# Patient Record
Sex: FEMALE | HISPANIC OR LATINO | URBAN - METROPOLITAN AREA
[De-identification: names, ages, dates, MRNs, and addresses within clinical notes are randomized per-mention and may not be internally consistent; named-entity substitution may affect disease eponyms.]

---

## 2017-04-07 ENCOUNTER — APPOINTMENT (OUTPATIENT)
Age: 42
Setting detail: DERMATOLOGY
End: 2017-04-23

## 2017-04-07 DIAGNOSIS — Z71.89 OTHER SPECIFIED COUNSELING: ICD-10-CM

## 2017-04-07 DIAGNOSIS — L57.8 OTHER SKIN CHANGES DUE TO CHRONIC EXPOSURE TO NONIONIZING RADIATION: ICD-10-CM

## 2017-04-07 DIAGNOSIS — L30.9 DERMATITIS, UNSPECIFIED: ICD-10-CM

## 2017-04-07 PROCEDURE — OTHER COUNSELING: OTHER

## 2017-04-07 PROCEDURE — OTHER ORDER TESTS: OTHER

## 2017-04-07 PROCEDURE — OTHER PRESCRIPTION: OTHER

## 2017-04-07 PROCEDURE — 99202 OFFICE O/P NEW SF 15 MIN: CPT

## 2017-04-07 RX ORDER — DESONIDE 0.5 MG/G
OINTMENT TOPICAL
Qty: 1 | Refills: 0 | Status: ERX | COMMUNITY
Start: 2017-04-07

## 2017-04-07 ASSESSMENT — LOCATION DETAILED DESCRIPTION DERM
LOCATION DETAILED: LEFT CENTRAL MALAR CHEEK
LOCATION DETAILED: RIGHT CENTRAL MALAR CHEEK
LOCATION DETAILED: LEFT LATERAL SUPERIOR EYELID

## 2017-04-07 ASSESSMENT — LOCATION SIMPLE DESCRIPTION DERM
LOCATION SIMPLE: LEFT CHEEK
LOCATION SIMPLE: LEFT SUPERIOR EYELID
LOCATION SIMPLE: RIGHT CHEEK

## 2017-04-07 ASSESSMENT — LOCATION ZONE DERM
LOCATION ZONE: FACE
LOCATION ZONE: EYELID

## 2017-06-16 ENCOUNTER — APPOINTMENT (OUTPATIENT)
Age: 42
Setting detail: DERMATOLOGY
End: 2017-06-20

## 2017-06-16 DIAGNOSIS — L91.8 OTHER HYPERTROPHIC DISORDERS OF THE SKIN: ICD-10-CM

## 2017-06-16 DIAGNOSIS — L30.9 DERMATITIS, UNSPECIFIED: ICD-10-CM

## 2017-06-16 PROCEDURE — 17110 DESTRUCT B9 LESION 1-14: CPT

## 2017-06-16 PROCEDURE — OTHER BENIGN DESTRUCTION: OTHER

## 2017-06-16 PROCEDURE — OTHER TREATMENT REGIMEN: OTHER

## 2017-06-16 PROCEDURE — OTHER COUNSELING: OTHER

## 2017-06-16 PROCEDURE — 99213 OFFICE O/P EST LOW 20 MIN: CPT | Mod: 25

## 2017-06-16 ASSESSMENT — LOCATION ZONE DERM
LOCATION ZONE: FACE
LOCATION ZONE: AXILLAE
LOCATION ZONE: SCALP

## 2017-06-16 ASSESSMENT — LOCATION DETAILED DESCRIPTION DERM
LOCATION DETAILED: RIGHT INFERIOR CENTRAL MALAR CHEEK
LOCATION DETAILED: POSTERIOR MID-PARIETAL SCALP
LOCATION DETAILED: LEFT AXILLARY VAULT
LOCATION DETAILED: LEFT INFERIOR CENTRAL MALAR CHEEK

## 2017-06-16 ASSESSMENT — LOCATION SIMPLE DESCRIPTION DERM
LOCATION SIMPLE: POSTERIOR SCALP
LOCATION SIMPLE: RIGHT CHEEK
LOCATION SIMPLE: LEFT CHEEK
LOCATION SIMPLE: LEFT AXILLARY VAULT

## 2017-06-16 NOTE — PROCEDURE: BENIGN DESTRUCTION
Anesthesia Volume In Cc: 0
Detail Level: Detailed
Post-Care Instructions: I reviewed with the patient in detail post-care instructions. Patient is to wear sunprotection, and avoid picking at any of the treated lesions. Pt may apply Vaseline to crusted or scabbing areas.
Consent: The patient's consent was obtained including but not limited to risks of crusting, scabbing, blistering, scarring, darker or lighter pigmentary change, recurrence, incomplete removal and infection.
Medical Necessity Information: It is in your best interest to select a reason for this procedure from the list below. All of these items fulfill various CMS LCD requirements except the new and changing color options.
Add 52 Modifier (Optional): no
Bill Insurance (You Assume Risk Of Denial Or Audit By Selecting Yes): Yes
Medical Necessity Clause: This procedure was medically necessary because the lesions that were treated were:

## 2017-06-16 NOTE — PROCEDURE: TREATMENT REGIMEN
Detail Level: Detailed
Plan: RTO if symptoms worsen
Continue Regimen: Head and shoulders on the scalp\\nDesonide with flare

## 2018-11-16 ENCOUNTER — APPOINTMENT (RX ONLY)
Dept: URBAN - METROPOLITAN AREA CLINIC 322 | Facility: CLINIC | Age: 43
Setting detail: DERMATOLOGY
End: 2018-11-16

## 2018-11-16 DIAGNOSIS — B35.8 OTHER DERMATOPHYTOSES: ICD-10-CM

## 2018-11-16 DIAGNOSIS — L21.8 OTHER SEBORRHEIC DERMATITIS: ICD-10-CM

## 2018-11-16 PROCEDURE — ? PRESCRIPTION

## 2018-11-16 PROCEDURE — ? COUNSELING

## 2018-11-16 PROCEDURE — 99202 OFFICE O/P NEW SF 15 MIN: CPT

## 2018-11-16 RX ORDER — KETOCONAZOLE 20.5 MG/ML
SHAMPOO, SUSPENSION TOPICAL
Qty: 1 | Refills: 11 | Status: ERX | COMMUNITY
Start: 2018-11-16

## 2018-11-16 RX ORDER — KETOCONAZOLE 20 MG/G
CREAM TOPICAL
Qty: 1 | Refills: 3 | Status: ERX | COMMUNITY
Start: 2018-11-16

## 2018-11-16 RX ADMIN — KETOCONAZOLE: 20.5 SHAMPOO, SUSPENSION TOPICAL at 21:14

## 2018-11-16 RX ADMIN — KETOCONAZOLE: 20 CREAM TOPICAL at 21:14

## 2018-11-16 ASSESSMENT — LOCATION ZONE DERM
LOCATION ZONE: EAR
LOCATION ZONE: FACE
LOCATION ZONE: SCALP

## 2018-11-16 ASSESSMENT — LOCATION SIMPLE DESCRIPTION DERM
LOCATION SIMPLE: RIGHT CHEEK
LOCATION SIMPLE: LEFT EAR
LOCATION SIMPLE: LEFT OCCIPITAL SCALP
LOCATION SIMPLE: LEFT CHEEK
LOCATION SIMPLE: SUPERIOR FOREHEAD
LOCATION SIMPLE: RIGHT EAR

## 2018-11-16 ASSESSMENT — LOCATION DETAILED DESCRIPTION DERM
LOCATION DETAILED: LEFT SUPERIOR HELIX
LOCATION DETAILED: RIGHT INFERIOR CENTRAL MALAR CHEEK
LOCATION DETAILED: RIGHT SUPERIOR HELIX
LOCATION DETAILED: SUPERIOR MID FOREHEAD
LOCATION DETAILED: LEFT CENTRAL MALAR CHEEK
LOCATION DETAILED: LEFT SUPERIOR OCCIPITAL SCALP

## 2019-06-26 ENCOUNTER — APPOINTMENT (RX ONLY)
Dept: URBAN - METROPOLITAN AREA CLINIC 167 | Facility: CLINIC | Age: 44
Setting detail: DERMATOLOGY
End: 2019-06-26

## 2019-06-26 DIAGNOSIS — L40.8 OTHER PSORIASIS: ICD-10-CM

## 2019-06-26 PROCEDURE — 99202 OFFICE O/P NEW SF 15 MIN: CPT

## 2019-06-26 PROCEDURE — ? PRESCRIPTION

## 2019-06-26 PROCEDURE — ? COUNSELING

## 2019-06-26 PROCEDURE — ? TREATMENT REGIMEN

## 2019-06-26 RX ORDER — TACROLIMUS 1 MG/G
OINTMENT TOPICAL
Qty: 1 | Refills: 6 | Status: ERX | COMMUNITY
Start: 2019-06-26

## 2019-06-26 RX ORDER — FLUOCINONIDE 0.5 MG/ML
SOLUTION TOPICAL
Qty: 1 | Refills: 3 | Status: ERX | COMMUNITY
Start: 2019-06-26

## 2019-06-26 RX ADMIN — TACROLIMUS: 1 OINTMENT TOPICAL at 00:00

## 2019-06-26 RX ADMIN — FLUOCINONIDE: 0.5 SOLUTION TOPICAL at 00:00

## 2019-06-26 ASSESSMENT — LOCATION ZONE DERM
LOCATION ZONE: SCALP
LOCATION ZONE: FACE
LOCATION ZONE: EAR

## 2019-06-26 ASSESSMENT — LOCATION DETAILED DESCRIPTION DERM
LOCATION DETAILED: LEFT SUPERIOR PARIETAL SCALP
LOCATION DETAILED: LEFT INFERIOR CENTRAL MALAR CHEEK
LOCATION DETAILED: LEFT ANTIHELIX
LOCATION DETAILED: RIGHT TRIANGULAR FOSSA

## 2019-06-26 ASSESSMENT — LOCATION SIMPLE DESCRIPTION DERM
LOCATION SIMPLE: LEFT CHEEK
LOCATION SIMPLE: RIGHT EAR
LOCATION SIMPLE: SCALP
LOCATION SIMPLE: LEFT EAR

## 2019-06-26 NOTE — PROCEDURE: TREATMENT REGIMEN
Discontinue Regimen: Desonide
Initiate Treatment: Tacrolimus 1% ointment bid \\nFluocinonide 0.05% solution daily to the scalp and ears x2 weeks \\nShampoo daily
Otc Regimen: Vanicream Zbar twice a day
Detail Level: Zone

## 2019-06-26 NOTE — HPI: RASH
What Type Of Note Output Would You Prefer (Optional)?: Bullet Format
How Severe Is Your Rash?: mild
Is This A New Presentation, Or A Follow-Up?: Rash
Additional History: Patient reports ketoconazole burns when she used it in the past. Does not shampoo everyday, when flaking is severe she uses head and shoulders shampoo

## 2019-08-05 ENCOUNTER — APPOINTMENT (RX ONLY)
Dept: URBAN - METROPOLITAN AREA CLINIC 167 | Facility: CLINIC | Age: 44
Setting detail: DERMATOLOGY
End: 2019-08-05

## 2019-08-05 DIAGNOSIS — L21.8 OTHER SEBORRHEIC DERMATITIS: ICD-10-CM

## 2019-08-05 PROCEDURE — 99212 OFFICE O/P EST SF 10 MIN: CPT

## 2019-08-05 PROCEDURE — ? COUNSELING

## 2019-08-05 PROCEDURE — ? TREATMENT REGIMEN

## 2019-08-05 ASSESSMENT — LOCATION ZONE DERM
LOCATION ZONE: EAR
LOCATION ZONE: SCALP
LOCATION ZONE: FACE

## 2019-08-05 ASSESSMENT — LOCATION DETAILED DESCRIPTION DERM
LOCATION DETAILED: LEFT CAVUM CONCHA
LOCATION DETAILED: RIGHT CAVUM CONCHA
LOCATION DETAILED: MID-FRONTAL SCALP
LOCATION DETAILED: INFERIOR MID FOREHEAD

## 2019-08-05 ASSESSMENT — LOCATION SIMPLE DESCRIPTION DERM
LOCATION SIMPLE: ANTERIOR SCALP
LOCATION SIMPLE: LEFT EAR
LOCATION SIMPLE: INFERIOR FOREHEAD
LOCATION SIMPLE: RIGHT EAR

## 2019-08-05 NOTE — PROCEDURE: TREATMENT REGIMEN
Continue Regimen: Tacrolimus ointment bid to the face as needed\\nFluocinonide solution daily to the scalp and ears for up to two weeks as needed \\nVanicream zbar daily\\nShampoo daily
Detail Level: Zone

## 2022-06-22 ENCOUNTER — APPOINTMENT (RX ONLY)
Dept: URBAN - METROPOLITAN AREA CLINIC 167 | Facility: CLINIC | Age: 47
Setting detail: DERMATOLOGY
End: 2022-06-22

## 2022-06-22 DIAGNOSIS — L08.9 LOCAL INFECTION OF THE SKIN AND SUBCUTANEOUS TISSUE, UNSPECIFIED: ICD-10-CM

## 2022-06-22 DIAGNOSIS — L259 CONTACT DERMATITIS AND OTHER ECZEMA, UNSPECIFIED CAUSE: ICD-10-CM

## 2022-06-22 PROBLEM — L23.9 ALLERGIC CONTACT DERMATITIS, UNSPECIFIED CAUSE: Status: ACTIVE | Noted: 2022-06-22

## 2022-06-22 PROCEDURE — ? COUNSELING

## 2022-06-22 PROCEDURE — ? ORDER TESTS

## 2022-06-22 PROCEDURE — ? PRESCRIPTION

## 2022-06-22 PROCEDURE — ? OTHER

## 2022-06-22 PROCEDURE — ? TREATMENT REGIMEN

## 2022-06-22 PROCEDURE — 99213 OFFICE O/P EST LOW 20 MIN: CPT

## 2022-06-22 RX ORDER — TRIAMCINOLONE ACETONIDE 1 MG/G
OINTMENT TOPICAL
Qty: 30 | Refills: 1 | Status: ERX | COMMUNITY
Start: 2022-06-22

## 2022-06-22 RX ADMIN — TRIAMCINOLONE ACETONIDE: 1 OINTMENT TOPICAL at 00:00

## 2022-06-22 ASSESSMENT — LOCATION SIMPLE DESCRIPTION DERM: LOCATION SIMPLE: LEFT THIGH

## 2022-06-22 ASSESSMENT — LOCATION DETAILED DESCRIPTION DERM: LOCATION DETAILED: LEFT ANTERIOR PROXIMAL THIGH

## 2022-06-22 ASSESSMENT — LOCATION ZONE DERM: LOCATION ZONE: LEG

## 2022-06-22 NOTE — PROCEDURE: TREATMENT REGIMEN
Initiate Treatment: triamcinolone acetonide 0.1 % topical ointment apply twice daily for mild to moderate rash on legs for up to 2 weeks per month as needed
Detail Level: Zone

## 2022-06-22 NOTE — PROCEDURE: OTHER
Detail Level: Detailed
Other (Free Text): Skin lesions x 1 week\\nleft thigh and right thigh\\nitchy, red, swollen, tender, and oozing\\n\\nWas in an auto accident at the end of Jan\\nHas been under a lot of stress\\nOn a lot of meds\\n\\nWent to [place] 1.5 weeks ago\\nThought she was bit\\nFelt two bumps on the L upper thigh\\nThey were smaller than pimples\\nThroughout the day, it turned into a light pink rash\\nShe covered them with some bandaids (crossing them)\\nRash has gotten worse\\nStarted swelling around the bandages\\nThis morning, they were more swollen\\n\\nWent to hospital this morning\\nThey thought it was a bite\\nand thought it might be an infection\\nTx'd with doxy and an anti-fungal cream (clotrimazole 1% cr)\\nStarted one dose at the hospital\\nNow has a spot on the R thigh - a couple days after the first one appeared\\nHas one small pimple like lesion on the L wrist since this morning\\nAlso took benadryl today\\nWas using benadryl cream on the skin lesions\\nFelt clammy yesterday\\nWas 99.0 (low grade fever)\\n\\nWill Cx to r/o infection at the original site - it is poss it is a mild infection or not infected at all\\nThere might be minimal yellow crust, but an acute spongiotic dermatitis (ACD) can also cause this\\nshe already used neomycin once, but not today - I recc just using this topical\\nshe can stop doxy - will wait for cx results\\nCan start vinegar soaks\\ncall if worsening\\nHowever, I doubt infection of the newer lesions (I suspect ACD there) - unless they became secondarily infected from the primary group of lesions\\n\\nHas had fragrance allergies in the past\\nHas reacted to adhesives in the past\\nProbable ACD to colophony (common in adhesives and a marker for fragrance allergies) or other component in the adhesives (e.g. acrylates, which I did not specifically discuss with the patient at her visit)\\nthe rash spread to the areas where the bandages were placed\\nI gave the patient the [employer] article discussing alternatives for bandages when a patient has contact allergies - I am not sure if it contains replacements for patients with colophony allergies\\nI suggested that the silicone-based bandages were probably the safest bet\\nor Telfa pads with paper tape\\n**after the patient left, I also noted that acrylates can cause ACD in some patients - she should avoid both colophony and acrylates (which unfortunately are not often listed as ingredients on bandage packaging) - avoid the bandages listed under acrylics\\nstart TAC oint BID prn\\n
Render Risk Assessment In Note?: no
Note Text (......Xxx Chief Complaint.): This diagnosis correlates with the

## 2022-06-22 NOTE — PROCEDURE: ORDER TESTS
Billing Type: Third-Party Bill
Bill For Surgical Tray: no
Performing Laboratory: [id number]
Expected Date Of Service: 06/22/2022

## 2022-06-22 NOTE — HPI: SKIN LESION
What Type Of Note Output Would You Prefer (Optional)?: Bullet Format
Is This A New Presentation, Or A Follow-Up?: Skin Lesions
Additional History: Patient states she was seen in hospital this morning , unsure what hospital she was see .\\nPatient was prescribed Doxycycline twice a day. Unsure of dosage and for how long she needs to use.\\nWas also Clotrimazole cream 1% and has not used cream. \\n.

## 2023-09-05 ENCOUNTER — APPOINTMENT (RX ONLY)
Dept: URBAN - METROPOLITAN AREA CLINIC 167 | Facility: CLINIC | Age: 48
Setting detail: DERMATOLOGY
End: 2023-09-05

## 2023-09-05 VITALS — HEART RATE: 71 BPM | SYSTOLIC BLOOD PRESSURE: 120 MMHG | DIASTOLIC BLOOD PRESSURE: 77 MMHG

## 2023-09-05 DIAGNOSIS — L21.8 OTHER SEBORRHEIC DERMATITIS: ICD-10-CM

## 2023-09-05 DIAGNOSIS — L65.0 TELOGEN EFFLUVIUM: ICD-10-CM

## 2023-09-05 PROCEDURE — ? PRESCRIPTION

## 2023-09-05 PROCEDURE — ? COUNSELING

## 2023-09-05 PROCEDURE — 99214 OFFICE O/P EST MOD 30 MIN: CPT

## 2023-09-05 PROCEDURE — ? TREATMENT REGIMEN

## 2023-09-05 RX ORDER — MINOXIDIL 2.5 MG/1
TABLET ORAL
Qty: 45 | Refills: 3 | Status: ERX | COMMUNITY
Start: 2023-09-05

## 2023-09-05 RX ADMIN — MINOXIDIL: 2.5 TABLET ORAL at 00:00

## 2023-09-05 ASSESSMENT — LOCATION DETAILED DESCRIPTION DERM
LOCATION DETAILED: POSTERIOR MID-PARIETAL SCALP
LOCATION DETAILED: LEFT MEDIAL FRONTAL SCALP

## 2023-09-05 ASSESSMENT — LOCATION SIMPLE DESCRIPTION DERM
LOCATION SIMPLE: POSTERIOR SCALP
LOCATION SIMPLE: LEFT SCALP

## 2023-09-05 ASSESSMENT — LOCATION ZONE DERM: LOCATION ZONE: SCALP

## 2023-09-05 NOTE — PROCEDURE: TREATMENT REGIMEN
Plan: Pt brought bloodwork results with her. Ferritin levels are at 35 but everything else is WNL.  Increase Fe supplement to daily.
Detail Level: Simple
Initiate Treatment: minoxidil 2.5 mg tablet: Take one half of a pill daily
Plan: Shampoo more frequently

## 2024-03-04 ENCOUNTER — APPOINTMENT (RX ONLY)
Dept: URBAN - METROPOLITAN AREA CLINIC 167 | Facility: CLINIC | Age: 49
Setting detail: DERMATOLOGY
End: 2024-03-04

## 2024-03-04 DIAGNOSIS — L65.0 TELOGEN EFFLUVIUM: ICD-10-CM | Status: IMPROVED

## 2024-03-04 PROCEDURE — 99213 OFFICE O/P EST LOW 20 MIN: CPT

## 2024-03-04 PROCEDURE — ? ORDER TESTS

## 2024-03-04 PROCEDURE — ? COUNSELING

## 2024-03-04 PROCEDURE — ? PRESCRIPTION

## 2024-03-04 PROCEDURE — ? PHOTO-DOCUMENTATION

## 2024-03-04 PROCEDURE — ? TREATMENT REGIMEN

## 2024-03-04 RX ORDER — MINOXIDIL 2.5 MG/1
1 TABLET ORAL
Qty: 45 | Refills: 3 | Status: ERX

## 2024-03-04 ASSESSMENT — LOCATION DETAILED DESCRIPTION DERM: LOCATION DETAILED: LEFT MEDIAL FRONTAL SCALP

## 2024-03-04 ASSESSMENT — LOCATION ZONE DERM: LOCATION ZONE: SCALP

## 2024-03-04 ASSESSMENT — LOCATION SIMPLE DESCRIPTION DERM: LOCATION SIMPLE: LEFT SCALP

## 2024-03-04 NOTE — PROCEDURE: ORDER TESTS
Performing Laboratory: [id number]
Bill For Surgical Tray: no
Expected Date Of Service: 03/04/2024
Billing Type: Third-Party Bill

## 2024-03-04 NOTE — PROCEDURE: TREATMENT REGIMEN
Otc Regimen: Fe supplements daily- pt reports constipation\\nHair oils and conditioner for dryness
Plan: Pt brought bloodwork results with her. Ferritin levels are at 35 but everything else is WNL.\\nPt reports no GI issues, expect IBS
Continue Regimen: minoxidil 2.5 mg tablet: Take one half of a pill daily
Detail Level: Simple

## 2024-03-04 NOTE — PROCEDURE: PHOTO-DOCUMENTATION
Detail Level: Detailed
Photo Preface (Leave Blank If You Do Not Want): Baseline photos taken for future reference.
flu-like symptoms

## 2024-09-16 ENCOUNTER — APPOINTMENT (RX ONLY)
Dept: URBAN - METROPOLITAN AREA CLINIC 167 | Facility: CLINIC | Age: 49
Setting detail: DERMATOLOGY
End: 2024-09-16

## 2024-09-16 DIAGNOSIS — L65.0 TELOGEN EFFLUVIUM: ICD-10-CM

## 2024-09-16 DIAGNOSIS — L259 CONTACT DERMATITIS AND OTHER ECZEMA, UNSPECIFIED CAUSE: ICD-10-CM | Status: RESOLVED

## 2024-09-16 PROBLEM — L23.9 ALLERGIC CONTACT DERMATITIS, UNSPECIFIED CAUSE: Status: ACTIVE | Noted: 2024-09-16

## 2024-09-16 PROCEDURE — ? COUNSELING

## 2024-09-16 PROCEDURE — ? TREATMENT REGIMEN

## 2024-09-16 PROCEDURE — 99213 OFFICE O/P EST LOW 20 MIN: CPT

## 2024-09-16 ASSESSMENT — LOCATION ZONE DERM
LOCATION ZONE: FACE
LOCATION ZONE: SCALP

## 2024-09-16 ASSESSMENT — LOCATION SIMPLE DESCRIPTION DERM
LOCATION SIMPLE: LEFT FOREHEAD
LOCATION SIMPLE: LEFT SCALP

## 2024-09-16 ASSESSMENT — LOCATION DETAILED DESCRIPTION DERM
LOCATION DETAILED: LEFT SUPERIOR MEDIAL FOREHEAD
LOCATION DETAILED: LEFT MEDIAL FRONTAL SCALP

## 2024-09-27 ENCOUNTER — APPOINTMENT (RX ONLY)
Dept: URBAN - METROPOLITAN AREA CLINIC 170 | Facility: CLINIC | Age: 49
Setting detail: DERMATOLOGY
End: 2024-09-27

## 2024-09-27 DIAGNOSIS — L50.3 DERMATOGRAPHIC URTICARIA: ICD-10-CM

## 2024-09-27 DIAGNOSIS — L63.8 OTHER ALOPECIA AREATA: ICD-10-CM

## 2024-09-27 PROBLEM — L65.9 NONSCARRING HAIR LOSS, UNSPECIFIED: Status: ACTIVE | Noted: 2024-09-27

## 2024-09-27 PROCEDURE — ? TREATMENT REGIMEN

## 2024-09-27 PROCEDURE — 99213 OFFICE O/P EST LOW 20 MIN: CPT

## 2024-09-27 PROCEDURE — ? PHOTO-DOCUMENTATION

## 2024-09-27 PROCEDURE — ? COUNSELING

## 2024-09-27 ASSESSMENT — LOCATION ZONE DERM
LOCATION ZONE: FACE
LOCATION ZONE: TRUNK

## 2024-09-27 ASSESSMENT — LOCATION DETAILED DESCRIPTION DERM
LOCATION DETAILED: RIGHT SUPERIOR MEDIAL FOREHEAD
LOCATION DETAILED: INFERIOR THORACIC SPINE
LOCATION DETAILED: RIGHT SUPERIOR LATERAL FOREHEAD

## 2024-09-27 ASSESSMENT — LOCATION SIMPLE DESCRIPTION DERM
LOCATION SIMPLE: RIGHT FOREHEAD
LOCATION SIMPLE: UPPER BACK

## 2024-09-27 NOTE — PROCEDURE: TREATMENT REGIMEN
Detail Level: Simple
Continue Regimen: Minoxidil
Plan: Discussed treating with ILK
Otc Regimen: Allegra, Zyrtec, Claritin once daily as needed

## 2024-10-28 ENCOUNTER — APPOINTMENT (RX ONLY)
Dept: URBAN - METROPOLITAN AREA CLINIC 167 | Facility: CLINIC | Age: 49
Setting detail: DERMATOLOGY
End: 2024-10-28

## 2024-10-28 DIAGNOSIS — L63.8 OTHER ALOPECIA AREATA: ICD-10-CM | Status: STABLE

## 2024-10-28 PROBLEM — L65.9 NONSCARRING HAIR LOSS, UNSPECIFIED: Status: ACTIVE | Noted: 2024-10-28

## 2024-10-28 PROCEDURE — ? MEDICAL PHOTOGRAPHY REVIEW

## 2024-10-28 PROCEDURE — ? TREATMENT REGIMEN

## 2024-10-28 PROCEDURE — 99213 OFFICE O/P EST LOW 20 MIN: CPT

## 2024-10-28 PROCEDURE — ? COUNSELING

## 2024-10-28 ASSESSMENT — LOCATION ZONE DERM: LOCATION ZONE: FACE

## 2024-10-28 ASSESSMENT — LOCATION SIMPLE DESCRIPTION DERM: LOCATION SIMPLE: RIGHT FOREHEAD

## 2024-10-28 ASSESSMENT — LOCATION DETAILED DESCRIPTION DERM: LOCATION DETAILED: RIGHT SUPERIOR LATERAL FOREHEAD

## 2024-10-28 NOTE — PROCEDURE: TREATMENT REGIMEN
Detail Level: Simple
Continue Regimen: Minoxidil
Plan: Discussed possible biopsy to confirm diffuse Alopecia Areata. Discussed possible use of lópez inhibitors if the biopsy is positive for Alopecia Areata. Patient opts to continue using minoxidil and defer punch biopsy.

## 2025-02-19 RX ORDER — MINOXIDIL 2.5 MG/1
1 TABLET ORAL
Qty: 45 | Refills: 3 | Status: ERX

## 2025-06-18 ENCOUNTER — APPOINTMENT (OUTPATIENT)
Dept: URBAN - METROPOLITAN AREA CLINIC 167 | Facility: CLINIC | Age: 50
Setting detail: DERMATOLOGY
End: 2025-06-18

## 2025-06-18 DIAGNOSIS — S90.1 CONTUSION OF TOE WITHOUT DAMAGE TO NAIL: ICD-10-CM

## 2025-06-18 DIAGNOSIS — M86.6: ICD-10-CM

## 2025-06-18 PROBLEM — M86.68 OTHER CHRONIC OSTEOMYELITIS, OTHER SITE: Status: ACTIVE | Noted: 2025-06-18

## 2025-06-18 PROBLEM — S90.112A CONTUSION OF LEFT GREAT TOE WITHOUT DAMAGE TO NAIL, INITIAL ENCOUNTER: Status: ACTIVE | Noted: 2025-06-18

## 2025-06-18 PROCEDURE — ? TREATMENT REGIMEN

## 2025-06-18 PROCEDURE — ? COUNSELING

## 2025-06-18 PROCEDURE — ? PHOTO-DOCUMENTATION

## 2025-06-18 ASSESSMENT — LOCATION SIMPLE DESCRIPTION DERM: LOCATION SIMPLE: LEFT GREAT TOE

## 2025-06-18 ASSESSMENT — LOCATION DETAILED DESCRIPTION DERM: LOCATION DETAILED: LEFT GREAT TOENAIL

## 2025-06-18 ASSESSMENT — LOCATION ZONE DERM: LOCATION ZONE: TOENAIL

## 2025-06-18 NOTE — PROCEDURE: TREATMENT REGIMEN
Continue Regimen: Wash with soap and water daily \\nMupirocin and bandage daily
Plan: Discussed changes on toe being caused by a wound that has been taking a long time to heal, nail infection, neuropathic pain, or scar tissue pushing back against the skin causing tenderness. There is no visible infection, but discussed taking an MRI of the toe to check the bone as she has had swelling and redness of the toe with pain.  Recommended patient continue using mupirocin daily (pt has rx at home) and keeping toe covered with a bandaid to heal the fissures.  Patient prefers to have the MRI done. SMIL form filled out, faxed to scheduling line, and given to patient in office today.
Detail Level: Simple

## 2025-08-12 ENCOUNTER — APPOINTMENT (OUTPATIENT)
Dept: URBAN - METROPOLITAN AREA CLINIC 167 | Facility: CLINIC | Age: 50
Setting detail: DERMATOLOGY
End: 2025-08-12

## 2025-08-12 DIAGNOSIS — L63.8 OTHER ALOPECIA AREATA: ICD-10-CM

## 2025-08-12 DIAGNOSIS — L71.8 OTHER ROSACEA: ICD-10-CM

## 2025-08-12 PROBLEM — L65.9 NONSCARRING HAIR LOSS, UNSPECIFIED: Status: ACTIVE | Noted: 2025-08-12

## 2025-08-12 PROCEDURE — ? TREATMENT REGIMEN

## 2025-08-12 PROCEDURE — ? MEDICAL PHOTOGRAPHY REVIEW

## 2025-08-12 PROCEDURE — ? COUNSELING

## 2025-08-12 PROCEDURE — ? PRESCRIPTION

## 2025-08-12 RX ORDER — METRONIDAZOLE 7.5 MG/G
CREAM TOPICAL
Qty: 45 | Refills: 3 | Status: ERX | COMMUNITY
Start: 2025-08-12

## 2025-08-12 RX ADMIN — METRONIDAZOLE: 7.5 CREAM TOPICAL at 00:00

## 2025-08-12 ASSESSMENT — LOCATION SIMPLE DESCRIPTION DERM
LOCATION SIMPLE: ANTERIOR SCALP
LOCATION SIMPLE: RIGHT CHEEK
LOCATION SIMPLE: LEFT CHEEK
LOCATION SIMPLE: NOSE

## 2025-08-12 ASSESSMENT — LOCATION DETAILED DESCRIPTION DERM
LOCATION DETAILED: NASAL DORSUM
LOCATION DETAILED: RIGHT INFERIOR CENTRAL MALAR CHEEK
LOCATION DETAILED: MID-FRONTAL SCALP
LOCATION DETAILED: LEFT INFERIOR CENTRAL MALAR CHEEK

## 2025-08-12 ASSESSMENT — LOCATION ZONE DERM
LOCATION ZONE: NOSE
LOCATION ZONE: FACE
LOCATION ZONE: SCALP